# Patient Record
Sex: FEMALE | Race: WHITE | Employment: FULL TIME | URBAN - NONMETROPOLITAN AREA
[De-identification: names, ages, dates, MRNs, and addresses within clinical notes are randomized per-mention and may not be internally consistent; named-entity substitution may affect disease eponyms.]

---

## 2019-04-08 ENCOUNTER — HOSPITAL ENCOUNTER (EMERGENCY)
Age: 29
Discharge: HOME | End: 2019-04-08
Attending: EMERGENCY MEDICINE
Payer: COMMERCIAL

## 2019-04-08 VITALS
DIASTOLIC BLOOD PRESSURE: 70 MMHG | SYSTOLIC BLOOD PRESSURE: 102 MMHG | TEMPERATURE: 98.2 F | WEIGHT: 115 LBS | OXYGEN SATURATION: 99 % | RESPIRATION RATE: 16 BRPM | BODY MASS INDEX: 21.16 KG/M2 | HEIGHT: 62 IN | HEART RATE: 79 BPM

## 2019-04-08 DIAGNOSIS — K62.5 RECTAL BLEEDING: Primary | ICD-10-CM

## 2019-04-08 PROCEDURE — 99283 EMERGENCY DEPT VISIT LOW MDM: CPT

## 2019-04-08 RX ORDER — DOCUSATE SODIUM 100 MG/1
100 CAPSULE, LIQUID FILLED ORAL 2 TIMES DAILY
Qty: 60 CAPSULE | Refills: 0 | Status: SHIPPED | OUTPATIENT
Start: 2019-04-08

## 2019-04-08 ASSESSMENT — ENCOUNTER SYMPTOMS: RECTAL PAIN: 1

## 2019-04-08 ASSESSMENT — PAIN SCALES - GENERAL: PAINLEVEL_OUTOF10: 2

## 2019-04-08 ASSESSMENT — PAIN DESCRIPTION - LOCATION: LOCATION: ABDOMEN

## 2019-04-08 ASSESSMENT — PAIN DESCRIPTION - PAIN TYPE: TYPE: ACUTE PAIN

## 2019-04-08 NOTE — ED PROVIDER NOTES
Triage Chief Complaint:    Rectal Bleeding (not bleeding but had damian 4\" rectal prolapse come out last Wed and wants to come out with each BM, states needs to have PCP, long term constipaiton)    GARTH Rivero is a 34 y.o. female that presents to the ED with complaint of rectal pain and bleeding. She states several days ago she had a bowel movement and noticed a very large mass that protruded that was pink on that she had a push back in. She denies any current pain or bleeding. She has never had any pelvic surgeries she missed a chronic constipation over the past 5 years and states she always has to push above. No history of hemorrhoids    Past Medical History:   Diagnosis Date    Constipation      Past Surgical History:   Procedure Laterality Date    ADENOIDECTOMY      TYMPANOPLASTY       History reviewed. No pertinent family history.   Social History     Socioeconomic History    Marital status: Single     Spouse name: Not on file    Number of children: Not on file    Years of education: Not on file    Highest education level: Not on file   Occupational History    Not on file   Social Needs    Financial resource strain: Not on file    Food insecurity:     Worry: Not on file     Inability: Not on file    Transportation needs:     Medical: Not on file     Non-medical: Not on file   Tobacco Use    Smoking status: Current Every Day Smoker     Packs/day: 1.00     Types: Cigarettes    Smokeless tobacco: Never Used   Substance and Sexual Activity    Alcohol use: Yes     Comment: occasionally    Drug use: No    Sexual activity: Yes     Partners: Female   Lifestyle    Physical activity:     Days per week: Not on file     Minutes per session: Not on file    Stress: Not on file   Relationships    Social connections:     Talks on phone: Not on file     Gets together: Not on file     Attends Anglican service: Not on file     Active member of club or organization: Not on file     Attends meetings of clubs or organizations: Not on file     Relationship status: Not on file    Intimate partner violence:     Fear of current or ex partner: Not on file     Emotionally abused: Not on file     Physically abused: Not on file     Forced sexual activity: Not on file   Other Topics Concern    Not on file   Social History Narrative    Not on file     No current facility-administered medications for this encounter. Current Outpatient Medications   Medication Sig Dispense Refill    docusate sodium (COLACE) 100 MG capsule Take 1 capsule by mouth 2 times daily 60 capsule 0     No Known Allergies      ROS:    Review of Systems   Gastrointestinal: Positive for rectal pain. Genitourinary: Negative for decreased urine volume, dysuria, flank pain, frequency, menstrual problem, pelvic pain, urgency, vaginal bleeding, vaginal discharge and vaginal pain. All other systems reviewed and are negative. Nursing Notes Reviewed    Physical Exam:  ED Triage Vitals   Enc Vitals Group      BP       Pulse       Resp       Temp       Temp src       SpO2       Weight       Height       Head Circumference       Peak Flow       Pain Score       Pain Loc       Pain Edu? Excl. in [address]? Physical Exam   Constitutional: She is oriented to person, place, and time. She appears well-developed and well-nourished. HENT:   Head: Normocephalic and atraumatic. Eyes: Pupils are equal, round, and reactive to light. Neck: Normal range of motion. Neck supple. Pulmonary/Chest: No respiratory distress. Abdominal: Hernia confirmed negative in the right inguinal area. Genitourinary: Rectum normal. Rectal exam shows no external hemorrhoid, no internal hemorrhoid, no fissure, no mass, no tenderness, anal tone normal and guaiac negative stool. Pelvic exam was performed with patient supine. Musculoskeletal: Normal range of motion. Lymphadenopathy: No inguinal adenopathy noted on the right or left side.    Neurological: She is

## 2019-04-08 NOTE — LETTER
[facility] Emergency Department  [place] [address]  Phone: [phone]  Fax: [phone]               April 8, 2019    Patient: Matt Jay   YOB: 1990   Date of Visit: 4/8/2019       To Whom It May Concern:    Elo Molina was seen and treated in our emergency department on 4/8/2019. She needs excused from work today. She may return to work tomorrow, Tuesday, 4-9-19.       Sincerely,       Belen Cruz RN         Signature:__________________________________

## 2019-04-12 ENCOUNTER — OFFICE VISIT (OUTPATIENT)
Dept: BARIATRICS/WEIGHT MGMT | Age: 29
End: 2019-04-12
Payer: COMMERCIAL

## 2019-04-12 VITALS
HEART RATE: 75 BPM | SYSTOLIC BLOOD PRESSURE: 98 MMHG | WEIGHT: 113.4 LBS | HEIGHT: 62 IN | BODY MASS INDEX: 20.87 KG/M2 | DIASTOLIC BLOOD PRESSURE: 64 MMHG

## 2019-04-12 DIAGNOSIS — K62.3 RECTAL PROLAPSE: Primary | ICD-10-CM

## 2019-04-12 PROCEDURE — 99204 OFFICE O/P NEW MOD 45 MIN: CPT | Performed by: SURGERY

## 2019-04-12 ASSESSMENT — ENCOUNTER SYMPTOMS
NAUSEA: 0
VOMITING: 0
ABDOMINAL PAIN: 1
VOICE CHANGE: 0
CONSTIPATION: 1
BLOOD IN STOOL: 1
PHOTOPHOBIA: 0
SORE THROAT: 0
TROUBLE SWALLOWING: 0
ANAL BLEEDING: 1
DIARRHEA: 0
SHORTNESS OF BREATH: 0
WHEEZING: 0
COUGH: 0
COLOR CHANGE: 0

## 2019-04-12 NOTE — PROGRESS NOTES
presenting with Prolapsed Rectum. Only prolapses when she is straining for a BM. She had recent visit to ER.

## 2019-04-12 NOTE — PROGRESS NOTES
GENERAL SURGERY OFFICE NOTE    SUBJECTIVE:    Patient presenting today referred from Nano Tierney MD and No ref. provider found, for   Chief Complaint   Patient presents with    Consultation     Possible Rectal Prolapse        HPI: Sunil Kwan is a 34 y.o. female presenting with pain in the lower abdominal diffuse and is chronic, worsening and dull compared to patient's normal condition. It is moderate in intensity for a duration of 2 weeks and is intermittent with modifying factors increased by or worse with constipation  No constipation, BMs back to normal.    Thoroughly reviewed the patient's medical history, family history, social history and review of systems with the patient today in the office. Please see medical record for pertinent positives. Past Medical History:   Diagnosis Date    Constipation       Past Surgical History:   Procedure Laterality Date    ADENOIDECTOMY      TYMPANOPLASTY       Current Outpatient Medications   Medication Sig Dispense Refill    VALERIAN ROOT PO Take by mouth nightly      polyethylene glycol (GOLYTELY) 236 g solution Take 4,000 mLs by mouth once for 1 dose 4000 mL 0    bisacodyl (BISACODYL) 5 MG EC tablet Take 4 tablets by mouth 2 times daily 8 tablet 3    docusate sodium (COLACE) 100 MG capsule Take 1 capsule by mouth 2 times daily 60 capsule 0     No current facility-administered medications for this visit. No Known Allergies        Review of Systems   Constitutional: Negative for activity change, chills, diaphoresis and fever. HENT: Negative for sore throat, trouble swallowing and voice change. Eyes: Negative for photophobia and visual disturbance. Respiratory: Negative for cough, shortness of breath and wheezing. Cardiovascular: Negative for chest pain, palpitations and leg swelling. Gastrointestinal: Positive for abdominal pain, anal bleeding, blood in stool and constipation. Negative for diarrhea, nausea and vomiting. Endocrine: Negative for cold intolerance, heat intolerance, polydipsia and polyuria. Genitourinary: Negative for dysuria, frequency and hematuria. Musculoskeletal: Negative for joint swelling, myalgias and neck stiffness. Skin: Negative for color change and rash. Neurological: Negative for seizures, speech difficulty, light-headedness and numbness. Hematological: Negative for adenopathy. Does not bruise/bleed easily. OBJECTIVE:    BP 98/64 (Site: Right Upper Arm, Position: Sitting, Cuff Size: Medium Adult)   Pulse 75   Ht 5' 2\" (1.575 m)   Wt 113 lb 6.4 oz (51.4 kg)   LMP 03/13/2019 (Approximate)   BMI 20.74 kg/m²    Body mass index is 20.74 kg/m². Physical Exam   Constitutional: She is oriented to person, place, and time. She appears well-developed and well-nourished. No distress. HENT:   Head: Normocephalic and atraumatic. Eyes: Pupils are equal, round, and reactive to light. Conjunctivae and EOM are normal. No scleral icterus. Neck: Normal range of motion. No JVD present. No tracheal deviation present. No thyromegaly present. Cardiovascular: Normal rate, regular rhythm, normal heart sounds and intact distal pulses. Exam reveals no gallop and no friction rub. No murmur heard. Pulmonary/Chest: Effort normal. No stridor. No respiratory distress. She has no wheezes. She has no rales. She exhibits no tenderness. Abdominal: Soft. Bowel sounds are normal. She exhibits no distension and no mass. There is tenderness (Lower abdominal). There is no rebound and no guarding. Musculoskeletal: Normal range of motion. She exhibits no edema or tenderness. Lymphadenopathy:     She has no cervical adenopathy. Neurological: She is alert and oriented to person, place, and time. No cranial nerve deficit. Coordination normal.   Skin: Skin is warm and dry. No rash noted. She is not diaphoretic. No erythema. No pallor.    Psychiatric: Her behavior is normal. Judgment and thought content normal.   Vitals reviewed. ASSESSMENT & PLAN:    1. Rectal prolapse         Needs a colonoscopy, and CT scan, then rectopexy if need be. Patient counseled on the risks, benefits, and alternatives of treatment plan at length while in the office today. Patient states an understanding and willingness to proceed with the plan. Orders Placed This Encounter   Medications    polyethylene glycol (GOLYTELY) 236 g solution     Sig: Take 4,000 mLs by mouth once for 1 dose     Dispense:  4000 mL     Refill:  0    bisacodyl (BISACODYL) 5 MG EC tablet     Sig: Take 4 tablets by mouth 2 times daily     Dispense:  8 tablet     Refill:  3     CT abd pelvis. Follow Up:  Return in about 2 weeks (around 4/26/2019) for colonoscopy. Tawny Dickey MD, FACS, FICS. 4/12/19       Patient was seen with total face to face time of 25 minutes. More than 50% of this visit was counseling and education as above in my assessment and plan.

## 2019-04-17 ENCOUNTER — HOSPITAL ENCOUNTER (OUTPATIENT)
Dept: CT IMAGING | Age: 29
Discharge: HOME | End: 2019-04-17
Payer: COMMERCIAL

## 2019-04-17 DIAGNOSIS — K62.3 RECTAL PROLAPSE: ICD-10-CM

## 2019-04-17 PROCEDURE — 6360000004 HC RX CONTRAST MEDICATION: Performed by: SURGERY

## 2019-04-17 PROCEDURE — 74176 CT ABD & PELVIS W/O CONTRAST: CPT

## 2019-04-17 RX ADMIN — IOHEXOL 50 ML: 240 INJECTION, SOLUTION INTRATHECAL; INTRAVASCULAR; INTRAVENOUS; ORAL at 10:55

## 2019-04-23 ENCOUNTER — TELEPHONE (OUTPATIENT)
Dept: BARIATRICS/WEIGHT MGMT | Age: 29
End: 2019-04-23

## 2022-04-23 ENCOUNTER — HOSPITAL ENCOUNTER (EMERGENCY)
Age: 32
Discharge: HOME | End: 2022-04-23
Attending: STUDENT IN AN ORGANIZED HEALTH CARE EDUCATION/TRAINING PROGRAM
Payer: COMMERCIAL

## 2022-04-23 ENCOUNTER — APPOINTMENT (OUTPATIENT)
Dept: GENERAL RADIOLOGY | Age: 32
End: 2022-04-23
Payer: COMMERCIAL

## 2022-04-23 VITALS
HEIGHT: 62 IN | DIASTOLIC BLOOD PRESSURE: 86 MMHG | BODY MASS INDEX: 23.46 KG/M2 | SYSTOLIC BLOOD PRESSURE: 116 MMHG | HEART RATE: 79 BPM | OXYGEN SATURATION: 100 % | RESPIRATION RATE: 18 BRPM | WEIGHT: 127.5 LBS | TEMPERATURE: 98.1 F

## 2022-04-23 DIAGNOSIS — S29.019A STRAIN OF THORACIC REGION, INITIAL ENCOUNTER: Primary | ICD-10-CM

## 2022-04-23 PROCEDURE — 99283 EMERGENCY DEPT VISIT LOW MDM: CPT

## 2022-04-23 PROCEDURE — 72070 X-RAY EXAM THORAC SPINE 2VWS: CPT

## 2022-04-23 NOTE — ED PROVIDER NOTES
[address]      Pt Name: Tony Tijerina  MRN: [id number]  [place] 1990  Date of evaluation: 4/23/2022  Provider: Anahi Sanders MD    [address] [place] [address]       Chief Complaint   Patient presents with    Back Pain     pt tried stretching out her back last night and felt pain in \" middle of her back. \"        HISTORY OF PRESENT ILLNESS    Tony Tijerina is a 36 y.o. female No past medical history presenting for thoracic spine pain. Patient says she was doing different stretches yesterday and felt something pop in her back. She has since had pain to the area. Pain was sudden onset. No fall reported. Otherwise no lower extremity weakness, numbness, difficulties with bowel or bladder habits, fevers, chills. Nursing Notes were reviewed. REVIEW OF SYSTEMS     Review of Systems  A 10 point review of system was performed and is otherwise negative apart from what is noted in HPI. PAST MEDICAL HISTORY     Past Medical History:   Diagnosis Date    Constipation        SURGICAL HISTORY       Past Surgical History:   Procedure Laterality Date    ADENOIDECTOMY      TYMPANOPLASTY         CURRENT MEDICATIONS       Discharge Medication List as of 4/23/2022 11:36 AM      CONTINUE these medications which have NOT CHANGED    Details   LORATADINE ALLERGY RELIEF PO Take by mouthHistorical Med      VALERIAN ROOT PO Take by mouth nightlyHistorical Med      bisacodyl (BISACODYL) 5 MG EC tablet Take 4 tablets by mouth 2 times daily, Disp-8 tablet, R-3Normal      docusate sodium (COLACE) 100 MG capsule Take 1 capsule by mouth 2 times daily, Disp-60 capsule, R-0Print             ALLERGIES     Patient has no known allergies. FAMILY HISTORY     History reviewed. No pertinent family history.      SOCIAL HISTORY       Social History     Socioeconomic History    Marital status: Single     Spouse name: None    Number of children: None    Years of education: None    Highest education level: None   Occupational History    None   Tobacco Use    Smoking status: Current Every Day Smoker     Packs/day: 1.00     Types: Cigarettes    Smokeless tobacco: Never Used   Substance and Sexual Activity    Alcohol use: Yes     Comment: occasionally    Drug use: No    Sexual activity: Yes     Partners: Female   Other Topics Concern    None   Social History Narrative    None     Social Determinants of Health     Financial Resource Strain:     Difficulty of Paying Living Expenses: Not on file   Food Insecurity:     Worried About Running Out of Food in the Last Year: Not on file    Arturo of Food in the Last Year: Not on file   Transportation Needs:     Lack of Transportation (Medical): Not on file    Lack of Transportation (Non-Medical): Not on file   Physical Activity:     Days of Exercise per Week: Not on file    Minutes of Exercise per Session: Not on file   Stress:     Feeling of Stress : Not on file   Social Connections:     Frequency of Communication with Friends and Family: Not on file    Frequency of Social Gatherings with Friends and Family: Not on file    Attends Yazidi Services: Not on file    Active Member of [address] [place] [address] or Organizations: Not on file    Attends Club or Organization Meetings: Not on file    Marital Status: Not on file   Intimate Partner Violence:     Fear of Current or Ex-Partner: Not on file    Emotionally Abused: Not on file    Physically Abused: Not on file    Sexually Abused: Not on file   Housing Stability:     Unable to Pay for Housing in the Last Year: Not on file    Number of [place] in the Last Year: Not on file    Unstable Housing in the Last Year: Not on file       PHYSICAL EXAM       ED Triage Vitals [04/23/22 1040]   BP Temp Temp Source Pulse Resp SpO2 Height Weight   116/86 98.1 °F (36.7 °C) Oral 79 18 98 % 5' 2\" (1.575 m) 127 lb 8 oz (57.8 kg)         Physical Exam  Vitals and nursing note reviewed. Constitutional:       Appearance: She is not toxic-appearing. HENT:      Head: Normocephalic. Eyes:      Extraocular Movements: Extraocular movements intact. Conjunctiva/sclera: Conjunctivae normal.      Pupils: Pupils are equal, round, and reactive to light. Cardiovascular:      Rate and Rhythm: Normal rate. Pulmonary:      Effort: Pulmonary effort is normal. No respiratory distress. Abdominal:      General: Abdomen is flat. Musculoskeletal:         General: Normal range of motion. Cervical back: Normal range of motion. Comments: Mild tenderness to palpation to mid thoracic   Skin:     General: Skin is warm and dry. Neurological:      General: No focal deficit present. Mental Status: She is alert and oriented to person, place, and time. Cranial Nerves: No cranial nerve deficit. Sensory: No sensory deficit. Motor: No weakness. Gait: Gait normal.   Psychiatric:         Mood and Affect: Mood normal.         Behavior: Behavior normal.         DIAGNOSTIC RESULTS     EKG: All EKG's are interpreted by me in the absence of a cardiologist.      RADIOLOGY:   Interpretation per the Radiologist below, if available at the time of this note:    XR THORACIC SPINE (2 VIEWS)   Final Result   No acute abnormality of the thoracic spine             LABS:  Labs Reviewed - No data to display    All other labs were within normal range or not returned as of this dictation. EMERGENCY DEPARTMENT COURSE        DIFFERENTIAL DIAGNOSIS/MDM:   Vitals:    Vitals:    04/23/22 1040 04/23/22 1045   BP: 116/86 116/86   Pulse: 79    Resp: 18    Temp: 98.1 °F (36.7 °C)    TempSrc: Oral    SpO2: 98% 100%   Weight: 127 lb 8 oz (57.8 kg)    Height: 5' 2\" (1.575 m)        MDM  Number of Diagnoses or Management Options  Strain of thoracic region, initial encounter  Diagnosis management comments: 68-year-old male presenting for thoracic pain.   States that she felt as though she  her spine when she was doing stretches yesterday. She does stretches by crouching at the knees and pushing with her arms to elongate her spine. Minimal tenderness to palpation to the spine. No focal neurologic deficits. X-ray normal.  Instructed follow-up with primary care. Symptoms likely secondary to musculoskeletal strain. CONSULTS:  None    PROCEDURES:  Unless otherwise noted below, none. Procedures      FINAL IMPRESSION      1. Strain of thoracic region, initial encounter          PATIENT REFERRED TO:  Elyssa Mccollum MD  [place] [address]  [phone]    Schedule an appointment as soon as possible for a visit in 1 week  Follow up within 1 week, Return to ED sooner if symptoms worsen,       DISCHARGE MEDICATIONS:  Discharge Medication List as of 4/23/2022 11:36 AM        Controlled Substances Monitoring:     No flowsheet data found.     (Please note that portions of this note were completed with a voice recognition program.  Efforts were made to edit the dictations but occasionally words are mis-transcribed.)    Marjorie Mullen MD (electronically signed)  Attending Emergency Physician            Marjorie Mullen MD  04/23/22 9526